# Patient Record
Sex: FEMALE | Race: WHITE | NOT HISPANIC OR LATINO
[De-identification: names, ages, dates, MRNs, and addresses within clinical notes are randomized per-mention and may not be internally consistent; named-entity substitution may affect disease eponyms.]

---

## 2018-02-05 ENCOUNTER — RESULT REVIEW (OUTPATIENT)
Age: 33
End: 2018-02-05

## 2021-11-30 ENCOUNTER — RESULT REVIEW (OUTPATIENT)
Age: 36
End: 2021-11-30

## 2021-12-30 ENCOUNTER — APPOINTMENT (OUTPATIENT)
Dept: VASCULAR SURGERY | Facility: CLINIC | Age: 36
End: 2021-12-30

## 2021-12-30 ENCOUNTER — APPOINTMENT (OUTPATIENT)
Dept: OBGYN | Facility: CLINIC | Age: 36
End: 2021-12-30
Payer: COMMERCIAL

## 2021-12-30 VITALS
DIASTOLIC BLOOD PRESSURE: 73 MMHG | WEIGHT: 116 LBS | SYSTOLIC BLOOD PRESSURE: 106 MMHG | HEIGHT: 62 IN | BODY MASS INDEX: 21.35 KG/M2

## 2021-12-30 DIAGNOSIS — O02.1 MISSED ABORTION: ICD-10-CM

## 2021-12-30 PROCEDURE — 76815 OB US LIMITED FETUS(S): CPT

## 2021-12-30 PROCEDURE — 99204 OFFICE O/P NEW MOD 45 MIN: CPT | Mod: 25

## 2021-12-30 NOTE — PROCEDURE
[Transabdominal OB Sonogram] : Transabdominal OB Sonogram [Intrauterine Pregnancy] : intrauterine pregnancy [Yolk Sac] : yolk sac present [CRL: ___ (mm)] : CRL - [unfilled]Umm [Current GA by Sonogram: ___ (wks)] : Current GA by Sonogram: [unfilled]Uwks [___ day(s)] : [unfilled] days [Fetal Heart] : no fetal heart

## 2021-12-31 LAB
BASOPHILS # BLD AUTO: 0.02 K/UL
BASOPHILS NFR BLD AUTO: 0.3 %
EOSINOPHIL # BLD AUTO: 0.1 K/UL
EOSINOPHIL NFR BLD AUTO: 1.5 %
HCT VFR BLD CALC: 37.9 %
HGB BLD-MCNC: 12.7 G/DL
IMM GRANULOCYTES NFR BLD AUTO: 0.1 %
LYMPHOCYTES # BLD AUTO: 2.6 K/UL
LYMPHOCYTES NFR BLD AUTO: 38.1 %
MAN DIFF?: NORMAL
MCHC RBC-ENTMCNC: 32.2 PG
MCHC RBC-ENTMCNC: 33.5 GM/DL
MCV RBC AUTO: 95.9 FL
MONOCYTES # BLD AUTO: 0.37 K/UL
MONOCYTES NFR BLD AUTO: 5.4 %
NEUTROPHILS # BLD AUTO: 3.72 K/UL
NEUTROPHILS NFR BLD AUTO: 54.6 %
PLATELET # BLD AUTO: 283 K/UL
RBC # BLD: 3.95 M/UL
RBC # FLD: 13.7 %
WBC # FLD AUTO: 6.82 K/UL

## 2022-01-02 ENCOUNTER — OUTPATIENT (OUTPATIENT)
Dept: OUTPATIENT SERVICES | Facility: HOSPITAL | Age: 37
LOS: 1 days | End: 2022-01-02
Payer: COMMERCIAL

## 2022-01-02 DIAGNOSIS — Z11.52 ENCOUNTER FOR SCREENING FOR COVID-19: ICD-10-CM

## 2022-01-02 LAB
ABO + RH PNL BLD: NORMAL
BLD GP AB SCN SERPL QL: NORMAL
C TRACH RRNA SPEC QL NAA+PROBE: NOT DETECTED
N GONORRHOEA RRNA SPEC QL NAA+PROBE: NOT DETECTED
SARS-COV-2 RNA SPEC QL NAA+PROBE: SIGNIFICANT CHANGE UP
SOURCE AMPLIFICATION: NORMAL

## 2022-01-02 PROCEDURE — U0005: CPT

## 2022-01-02 PROCEDURE — C9803: CPT

## 2022-01-02 PROCEDURE — U0003: CPT

## 2022-01-03 ENCOUNTER — TRANSCRIPTION ENCOUNTER (OUTPATIENT)
Age: 37
End: 2022-01-03

## 2022-01-03 RX ORDER — LIDOCAINE HCL 20 MG/ML
0.2 VIAL (ML) INJECTION ONCE
Refills: 0 | Status: DISCONTINUED | OUTPATIENT
Start: 2022-01-04 | End: 2022-01-18

## 2022-01-03 RX ORDER — SODIUM CHLORIDE 9 MG/ML
3 INJECTION INTRAMUSCULAR; INTRAVENOUS; SUBCUTANEOUS EVERY 8 HOURS
Refills: 0 | Status: DISCONTINUED | OUTPATIENT
Start: 2022-01-04 | End: 2022-01-18

## 2022-01-04 ENCOUNTER — OUTPATIENT (OUTPATIENT)
Dept: OUTPATIENT SERVICES | Facility: HOSPITAL | Age: 37
LOS: 1 days | End: 2022-01-04
Payer: COMMERCIAL

## 2022-01-04 ENCOUNTER — APPOINTMENT (OUTPATIENT)
Dept: OBGYN | Facility: CLINIC | Age: 37
End: 2022-01-04

## 2022-01-04 ENCOUNTER — RESULT REVIEW (OUTPATIENT)
Age: 37
End: 2022-01-04

## 2022-01-04 VITALS
SYSTOLIC BLOOD PRESSURE: 148 MMHG | WEIGHT: 115.08 LBS | DIASTOLIC BLOOD PRESSURE: 72 MMHG | HEART RATE: 128 BPM | RESPIRATION RATE: 16 BRPM | OXYGEN SATURATION: 100 % | TEMPERATURE: 98 F | HEIGHT: 62 IN

## 2022-01-04 VITALS
SYSTOLIC BLOOD PRESSURE: 116 MMHG | TEMPERATURE: 97 F | RESPIRATION RATE: 15 BRPM | HEART RATE: 96 BPM | OXYGEN SATURATION: 100 % | DIASTOLIC BLOOD PRESSURE: 56 MMHG

## 2022-01-04 DIAGNOSIS — Z90.49 ACQUIRED ABSENCE OF OTHER SPECIFIED PARTS OF DIGESTIVE TRACT: Chronic | ICD-10-CM

## 2022-01-04 DIAGNOSIS — O02.1 MISSED ABORTION: ICD-10-CM

## 2022-01-04 LAB
ANION GAP SERPL CALC-SCNC: 16 MMOL/L — SIGNIFICANT CHANGE UP (ref 5–17)
BLD GP AB SCN SERPL QL: NEGATIVE — SIGNIFICANT CHANGE UP
BUN SERPL-MCNC: 5 MG/DL — LOW (ref 7–23)
CALCIUM SERPL-MCNC: 9.4 MG/DL — SIGNIFICANT CHANGE UP (ref 8.4–10.5)
CHLORIDE SERPL-SCNC: 102 MMOL/L — SIGNIFICANT CHANGE UP (ref 96–108)
CO2 SERPL-SCNC: 22 MMOL/L — SIGNIFICANT CHANGE UP (ref 22–31)
CREAT SERPL-MCNC: 0.44 MG/DL — LOW (ref 0.5–1.3)
GLUCOSE SERPL-MCNC: 91 MG/DL — SIGNIFICANT CHANGE UP (ref 70–99)
HCT VFR BLD CALC: 38.2 % — SIGNIFICANT CHANGE UP (ref 34.5–45)
HGB BLD-MCNC: 13.6 G/DL — SIGNIFICANT CHANGE UP (ref 11.5–15.5)
MCHC RBC-ENTMCNC: 32.2 PG — SIGNIFICANT CHANGE UP (ref 27–34)
MCHC RBC-ENTMCNC: 35.6 GM/DL — SIGNIFICANT CHANGE UP (ref 32–36)
MCV RBC AUTO: 90.5 FL — SIGNIFICANT CHANGE UP (ref 80–100)
NRBC # BLD: 0 /100 WBCS — SIGNIFICANT CHANGE UP (ref 0–0)
PLATELET # BLD AUTO: 255 K/UL — SIGNIFICANT CHANGE UP (ref 150–400)
POTASSIUM SERPL-MCNC: 3.7 MMOL/L — SIGNIFICANT CHANGE UP (ref 3.5–5.3)
POTASSIUM SERPL-SCNC: 3.7 MMOL/L — SIGNIFICANT CHANGE UP (ref 3.5–5.3)
RBC # BLD: 4.22 M/UL — SIGNIFICANT CHANGE UP (ref 3.8–5.2)
RBC # FLD: 13.2 % — SIGNIFICANT CHANGE UP (ref 10.3–14.5)
RH IG SCN BLD-IMP: POSITIVE — SIGNIFICANT CHANGE UP
RH IG SCN BLD-IMP: POSITIVE — SIGNIFICANT CHANGE UP
SODIUM SERPL-SCNC: 140 MMOL/L — SIGNIFICANT CHANGE UP (ref 135–145)
WBC # BLD: 6.06 K/UL — SIGNIFICANT CHANGE UP (ref 3.8–10.5)
WBC # FLD AUTO: 6.06 K/UL — SIGNIFICANT CHANGE UP (ref 3.8–10.5)

## 2022-01-04 PROCEDURE — 88280 CHROMOSOME KARYOTYPE STUDY: CPT

## 2022-01-04 PROCEDURE — 88233 TISSUE CULTURE SKIN/BIOPSY: CPT

## 2022-01-04 PROCEDURE — 88264 CHROMOSOME ANALYSIS 20-25: CPT

## 2022-01-04 PROCEDURE — 88305 TISSUE EXAM BY PATHOLOGIST: CPT

## 2022-01-04 PROCEDURE — 85027 COMPLETE CBC AUTOMATED: CPT

## 2022-01-04 PROCEDURE — 88305 TISSUE EXAM BY PATHOLOGIST: CPT | Mod: 26

## 2022-01-04 PROCEDURE — 59820 CARE OF MISCARRIAGE: CPT

## 2022-01-04 PROCEDURE — 86850 RBC ANTIBODY SCREEN: CPT

## 2022-01-04 PROCEDURE — 76998 US GUIDE INTRAOP: CPT | Mod: 26

## 2022-01-04 PROCEDURE — 86900 BLOOD TYPING SEROLOGIC ABO: CPT

## 2022-01-04 PROCEDURE — 86901 BLOOD TYPING SEROLOGIC RH(D): CPT

## 2022-01-04 PROCEDURE — 80048 BASIC METABOLIC PNL TOTAL CA: CPT

## 2022-01-04 RX ORDER — FENTANYL CITRATE 50 UG/ML
25 INJECTION INTRAVENOUS
Refills: 0 | Status: DISCONTINUED | OUTPATIENT
Start: 2022-01-04 | End: 2022-01-04

## 2022-01-04 RX ORDER — ONDANSETRON 8 MG/1
4 TABLET, FILM COATED ORAL ONCE
Refills: 0 | Status: DISCONTINUED | OUTPATIENT
Start: 2022-01-04 | End: 2022-01-18

## 2022-01-04 RX ORDER — OXYCODONE HYDROCHLORIDE 5 MG/1
5 TABLET ORAL ONCE
Refills: 0 | Status: DISCONTINUED | OUTPATIENT
Start: 2022-01-04 | End: 2022-01-04

## 2022-01-04 NOTE — H&P ADULT - HISTORY OF PRESENT ILLNESS
35 y/o  @9w2d (LMP 10/26/21) presents for consultation for termination of pregnancy due to miscarriage.     Miscarriage was confirmed at Dr. Espinosa office. Amrita bleeding.     POB:  2012: LEATHA  2016: JANY ZHANG    PGYN: amrita    Works as a speech therapist   COVID vaccinated, diagnosed with COVID 3 weeks ago

## 2022-01-04 NOTE — H&P ADULT - ASSESSMENT
35 y/o  @9w2d (LMP 10/26/21) presents for consultation for termination of pregnancy due to miscarriage.     - Consents signed and in allscripts  - COVID neg   - T&S and CBC in system, f/u BMP

## 2022-01-04 NOTE — BRIEF OPERATIVE NOTE - NSICDXBRIEFPOSTOP_GEN_ALL_CORE_FT
POST-OP DIAGNOSIS:  Intrauterine fetal death before 20 weeks of gestation 04-Jan-2022 12:45:48  Archana Peacock   POST-OP DIAGNOSIS:  Missed  2022 09:48:02  Elma Holbrook

## 2022-01-04 NOTE — BRIEF OPERATIVE NOTE - NSICDXBRIEFPREOP_GEN_ALL_CORE_FT
PRE-OP DIAGNOSIS:  Intrauterine fetal death before 20 weeks of gestation 04-Jan-2022 12:45:37  Archana Peacock   PRE-OP DIAGNOSIS:  Missed  2022 09:47:42 1. IUP @ 10  mila  2. missed  Elma Holbrook

## 2022-01-04 NOTE — ASU PATIENT PROFILE, ADULT - FALL HARM RISK - UNIVERSAL INTERVENTIONS
Bed in lowest position, wheels locked, appropriate side rails in place/Call bell, personal items and telephone in reach/Instruct patient to call for assistance before getting out of bed or chair/Non-slip footwear when patient is out of bed/Busy to call system/Physically safe environment - no spills, clutter or unnecessary equipment/Purposeful Proactive Rounding/Room/bathroom lighting operational, light cord in reach

## 2022-01-04 NOTE — BRIEF OPERATIVE NOTE - NSICDXBRIEFPROCEDURE_GEN_ALL_CORE_FT
PROCEDURES:  Dilation and curettage, uterus 04-Jan-2022 12:45:24  Archana Peacock   PROCEDURES:  Surgical treatment, missed , first trimester 2022 09:47:19 1. EUA  2. dilation and curettage under ultrasound guidance Elma Holbrook

## 2022-01-04 NOTE — H&P ADULT - NSHPPHYSICALEXAM_GEN_ALL_CORE
Constitutional: appropriately responsive, alert and no acute distress.   Abdomen: soft, non-tender and non-distended.   Neurological: oriented x3.

## 2022-01-04 NOTE — BRIEF OPERATIVE NOTE - OPERATION/FINDINGS
Anteverted uterus, approx 8 weeks on size. Dilation and curettage done under ultrasound guidance. Thin EM stripe noted at end of case. Gestational sac examined and c/w ega Anteverted uterus, approx 8 weeks on size. Dilation and curettage done under ultrasound guidance. Thin EM stripe noted at end of case. Gestational sac and villi examined and c/w ega Anteverted uterus, approx 8 weeks on size. products of conception approximately 8 weeks size, gestational sac and villi noted. BT: A+

## 2022-01-04 NOTE — ASU DISCHARGE PLAN (ADULT/PEDIATRIC) - ASU DC SPECIAL INSTRUCTIONSFT
Postoperative Instructions      Pain control     For pain control, take the followin. Motrin 600mg four times a day, take with food  2. Add Tylenol 975 four times a day, alternated with motrin    Motrin and Tylenol can be obtained over the counter.         Postoperative restrictions   Do not drive or make important decisions for 24 hours after anesthesia. Nothing in the vagina (tampons, sexual intercourse), No tub baths, pools or hot tubs for 2 weeks (showers are ok!). No lifting anything heavier than 15 lbs, no strenuous exercise for 2 weeks after surgery.        Vaginal bleeding   Spotting and intermittent passage of blood clots per vagina is normal in first few weeks after surgery. If you are soaking 1 pad per hour, that is not normal and you should notify Dr. Holbrook's office and seek medical attention right away.      Signs of Infection  Call the office and/or come to the emergency room for any of the following: foul smelling vaginal discharge, fever over 100.4F, abdominal pain or cramping that does not get better with over the counter medications, nausea/vomiting (especially if you become unable to tolerate oral intake), inability to urinate. Any of these could be signs that you may be developing an infection requiring antibiotics.      Follow Up  Call Dr. Holbrook's office to schedule a postoperative appointment in 2 weeks.

## 2022-01-04 NOTE — ASU DISCHARGE PLAN (ADULT/PEDIATRIC) - NURSING INSTRUCTIONS
Please take Motrin and/or Tylenol as needed for pain.  NEXT DOSE of Motrin or Tylenol due at.......630pm.

## 2022-01-04 NOTE — ASU DISCHARGE PLAN (ADULT/PEDIATRIC) - NS MD DC FALL RISK RISK
For information on Fall & Injury Prevention, visit: [email or web address] OR  [email or web address] OR  https://www.cdc.gov/steadi/patient.html

## 2022-01-05 ENCOUNTER — NON-APPOINTMENT (OUTPATIENT)
Age: 37
End: 2022-01-05

## 2022-01-07 ENCOUNTER — APPOINTMENT (OUTPATIENT)
Dept: OBGYN | Facility: CLINIC | Age: 37
End: 2022-01-07
Payer: COMMERCIAL

## 2022-01-07 VITALS
WEIGHT: 117 LBS | HEIGHT: 62 IN | BODY MASS INDEX: 21.53 KG/M2 | SYSTOLIC BLOOD PRESSURE: 110 MMHG | DIASTOLIC BLOOD PRESSURE: 70 MMHG

## 2022-01-07 DIAGNOSIS — R10.2 PELVIC AND PERINEAL PAIN: ICD-10-CM

## 2022-01-07 LAB
BILIRUB UR QL STRIP: NORMAL
GLUCOSE UR-MCNC: NORMAL
HCG UR QL: 0.2 EU/DL
HGB UR QL STRIP.AUTO: NORMAL
KETONES UR-MCNC: NORMAL
LEUKOCYTE ESTERASE UR QL STRIP: NORMAL
NITRITE UR QL STRIP: NORMAL
PH UR STRIP: 6.5
PROT UR STRIP-MCNC: NORMAL
SP GR UR STRIP: 1

## 2022-01-07 PROCEDURE — 99212 OFFICE O/P EST SF 10 MIN: CPT | Mod: 24

## 2022-01-07 PROCEDURE — 81003 URINALYSIS AUTO W/O SCOPE: CPT | Mod: QW

## 2022-01-10 LAB
BACTERIA UR CULT: NORMAL
SURGICAL PATHOLOGY STUDY: SIGNIFICANT CHANGE UP

## 2022-01-11 PROBLEM — Z00.00 ENCOUNTER FOR PREVENTIVE HEALTH EXAMINATION: Noted: 2022-01-11

## 2022-01-18 ENCOUNTER — APPOINTMENT (OUTPATIENT)
Dept: OBGYN | Facility: CLINIC | Age: 37
End: 2022-01-18

## 2022-01-18 ENCOUNTER — NON-APPOINTMENT (OUTPATIENT)
Age: 37
End: 2022-01-18

## 2022-01-18 DIAGNOSIS — Z09 ENCOUNTER FOR FOLLOW-UP EXAMINATION AFTER COMPLETED TREATMENT FOR CONDITIONS OTHER THAN MALIGNANT NEOPLASM: ICD-10-CM

## 2022-01-18 NOTE — HISTORY OF PRESENT ILLNESS
[Home] : at home, [unfilled] , at the time of the visit. [Medical Office: ([facility])___] : at the medical office located in  [Verbal consent obtained from patient] : the patient, [unfilled] [FreeTextEntry1] : 35 yo  s/p D+C at 10 0/7 weeks for missed  on 22 presents for follow up.  Pt was seen for urgent visit on 22 for abdominal pain and inability to hold urine.  exam was benign and urine culture was negative. \par She reports feeling pressure in lower pelvis during urination.

## 2022-01-24 LAB — CHROM ANALY OVERALL INTERP SPEC-IMP: SIGNIFICANT CHANGE UP

## 2022-02-11 ENCOUNTER — NON-APPOINTMENT (OUTPATIENT)
Age: 37
End: 2022-02-11

## 2022-02-25 ENCOUNTER — APPOINTMENT (OUTPATIENT)
Dept: PEDIATRIC MEDICAL GENETICS | Facility: CLINIC | Age: 37
End: 2022-02-25
Payer: COMMERCIAL

## 2022-02-25 PROCEDURE — 96040: CPT | Mod: 95

## 2022-03-01 NOTE — HISTORY OF PRESENT ILLNESS
[Home] : at home, [unfilled] , at the time of the visit. [Medical Office: ([facility])___] : at the medical office located in  [Verbal consent obtained from patient] : the patient, [unfilled]

## 2022-04-28 ENCOUNTER — APPOINTMENT (OUTPATIENT)
Dept: HUMAN REPRODUCTION | Facility: CLINIC | Age: 37
End: 2022-04-28

## 2022-06-09 ENCOUNTER — APPOINTMENT (OUTPATIENT)
Dept: HUMAN REPRODUCTION | Facility: CLINIC | Age: 37
End: 2022-06-09
Payer: COMMERCIAL

## 2022-06-09 PROCEDURE — 36415 COLL VENOUS BLD VENIPUNCTURE: CPT

## 2022-06-09 PROCEDURE — 76830 TRANSVAGINAL US NON-OB: CPT

## 2022-06-09 PROCEDURE — 99205 OFFICE O/P NEW HI 60 MIN: CPT | Mod: 25

## 2022-06-23 ENCOUNTER — APPOINTMENT (OUTPATIENT)
Dept: HUMAN REPRODUCTION | Facility: CLINIC | Age: 37
End: 2022-06-23
Payer: COMMERCIAL

## 2022-06-23 PROCEDURE — 76831 ECHO EXAM UTERUS: CPT

## 2022-06-23 PROCEDURE — 58340 CATHETER FOR HYSTEROGRAPHY: CPT

## 2022-06-27 ENCOUNTER — APPOINTMENT (OUTPATIENT)
Dept: HUMAN REPRODUCTION | Facility: CLINIC | Age: 37
End: 2022-06-27

## 2022-06-27 PROCEDURE — 36415 COLL VENOUS BLD VENIPUNCTURE: CPT

## 2022-07-13 ENCOUNTER — APPOINTMENT (OUTPATIENT)
Dept: UROGYNECOLOGY | Facility: CLINIC | Age: 37
End: 2022-07-13

## 2022-08-05 ENCOUNTER — APPOINTMENT (OUTPATIENT)
Dept: HUMAN REPRODUCTION | Facility: CLINIC | Age: 37
End: 2022-08-05

## 2022-08-05 PROCEDURE — 99215 OFFICE O/P EST HI 40 MIN: CPT | Mod: 95

## 2022-10-24 ENCOUNTER — APPOINTMENT (OUTPATIENT)
Dept: VASCULAR SURGERY | Facility: CLINIC | Age: 37
End: 2022-10-24

## 2022-10-24 VITALS
WEIGHT: 125 LBS | HEIGHT: 62 IN | HEART RATE: 114 BPM | BODY MASS INDEX: 23 KG/M2 | DIASTOLIC BLOOD PRESSURE: 77 MMHG | SYSTOLIC BLOOD PRESSURE: 121 MMHG | TEMPERATURE: 98.1 F

## 2022-10-24 VITALS — SYSTOLIC BLOOD PRESSURE: 115 MMHG | HEART RATE: 105 BPM | DIASTOLIC BLOOD PRESSURE: 74 MMHG

## 2022-10-24 DIAGNOSIS — Z00.00 ENCOUNTER FOR GENERAL ADULT MEDICAL EXAMINATION W/OUT ABNORMAL FINDINGS: ICD-10-CM

## 2022-10-24 PROCEDURE — 99203 OFFICE O/P NEW LOW 30 MIN: CPT

## 2022-10-24 PROCEDURE — 93970 EXTREMITY STUDY: CPT

## 2023-04-17 ENCOUNTER — APPOINTMENT (OUTPATIENT)
Dept: HUMAN REPRODUCTION | Facility: CLINIC | Age: 38
End: 2023-04-17

## 2023-08-22 ENCOUNTER — APPOINTMENT (OUTPATIENT)
Dept: OBGYN | Facility: CLINIC | Age: 38
End: 2023-08-22
Payer: COMMERCIAL

## 2023-08-22 PROCEDURE — 36415 COLL VENOUS BLD VENIPUNCTURE: CPT

## 2023-08-22 PROCEDURE — 76830 TRANSVAGINAL US NON-OB: CPT

## 2023-08-22 PROCEDURE — 99214 OFFICE O/P EST MOD 30 MIN: CPT | Mod: 25

## 2023-09-15 ENCOUNTER — APPOINTMENT (OUTPATIENT)
Dept: OBGYN | Facility: CLINIC | Age: 38
End: 2023-09-15
Payer: COMMERCIAL

## 2023-09-15 PROCEDURE — 0502F SUBSEQUENT PRENATAL CARE: CPT

## 2023-09-15 PROCEDURE — 36415 COLL VENOUS BLD VENIPUNCTURE: CPT

## 2023-09-15 PROCEDURE — 76817 TRANSVAGINAL US OBSTETRIC: CPT

## 2023-09-21 ENCOUNTER — TRANSCRIPTION ENCOUNTER (OUTPATIENT)
Age: 38
End: 2023-09-21

## 2023-09-29 ENCOUNTER — APPOINTMENT (OUTPATIENT)
Dept: OBGYN | Facility: CLINIC | Age: 38
End: 2023-09-29
Payer: COMMERCIAL

## 2023-09-29 PROCEDURE — 0502F SUBSEQUENT PRENATAL CARE: CPT

## 2023-09-29 PROCEDURE — 76813 OB US NUCHAL MEAS 1 GEST: CPT

## 2023-09-29 PROCEDURE — 36415 COLL VENOUS BLD VENIPUNCTURE: CPT

## 2023-10-18 ENCOUNTER — APPOINTMENT (OUTPATIENT)
Dept: OBGYN | Facility: CLINIC | Age: 38
End: 2023-10-18
Payer: COMMERCIAL

## 2023-10-18 PROCEDURE — 76815 OB US LIMITED FETUS(S): CPT

## 2023-10-18 PROCEDURE — 99213 OFFICE O/P EST LOW 20 MIN: CPT | Mod: 25

## 2023-10-19 ENCOUNTER — APPOINTMENT (OUTPATIENT)
Dept: OBGYN | Facility: CLINIC | Age: 38
End: 2023-10-19

## 2023-10-27 ENCOUNTER — APPOINTMENT (OUTPATIENT)
Dept: OBGYN | Facility: CLINIC | Age: 38
End: 2023-10-27
Payer: COMMERCIAL

## 2023-10-27 PROCEDURE — 90471 IMMUNIZATION ADMIN: CPT

## 2023-10-27 PROCEDURE — 90686 IIV4 VACC NO PRSV 0.5 ML IM: CPT

## 2023-10-27 PROCEDURE — 0502F SUBSEQUENT PRENATAL CARE: CPT

## 2023-10-27 PROCEDURE — 36415 COLL VENOUS BLD VENIPUNCTURE: CPT

## 2023-11-27 ENCOUNTER — APPOINTMENT (OUTPATIENT)
Dept: OBGYN | Facility: CLINIC | Age: 38
End: 2023-11-27
Payer: COMMERCIAL

## 2023-11-27 ENCOUNTER — APPOINTMENT (OUTPATIENT)
Dept: ANTEPARTUM | Facility: CLINIC | Age: 38
End: 2023-11-27
Payer: COMMERCIAL

## 2023-11-27 ENCOUNTER — ASOB RESULT (OUTPATIENT)
Age: 38
End: 2023-11-27

## 2023-11-27 PROCEDURE — 76811 OB US DETAILED SNGL FETUS: CPT

## 2023-11-27 PROCEDURE — 0502F SUBSEQUENT PRENATAL CARE: CPT

## 2023-12-08 NOTE — HISTORY OF PRESENT ILLNESS
[FreeTextEntry1] : 35 y/o  @9w2d (LMP 10/26/21) presents for consultation for termination of pregnancy due to miscarriage. \par \par Miscarriage was confirmed at Dr. Espinosa office. Denies bleeding. \par \par POB:\par 2012: \par 2016: TIUP \par \par PGYN: denies\par \par Works as a speech therapist \par COVID vaccinated, diagnosed with COVID 3 weeks ago True and Accepted Max Gage MD

## 2023-12-19 ENCOUNTER — ASOB RESULT (OUTPATIENT)
Age: 38
End: 2023-12-19

## 2023-12-19 ENCOUNTER — APPOINTMENT (OUTPATIENT)
Dept: ANTEPARTUM | Facility: CLINIC | Age: 38
End: 2023-12-19
Payer: COMMERCIAL

## 2023-12-19 PROCEDURE — 76816 OB US FOLLOW-UP PER FETUS: CPT

## 2024-01-02 ENCOUNTER — APPOINTMENT (OUTPATIENT)
Dept: OBGYN | Facility: CLINIC | Age: 39
End: 2024-01-02
Payer: COMMERCIAL

## 2024-01-02 PROCEDURE — 0502F SUBSEQUENT PRENATAL CARE: CPT

## 2024-01-18 ENCOUNTER — APPOINTMENT (OUTPATIENT)
Dept: OBGYN | Facility: CLINIC | Age: 39
End: 2024-01-18
Payer: COMMERCIAL

## 2024-01-18 PROCEDURE — 0502F SUBSEQUENT PRENATAL CARE: CPT

## 2024-01-18 PROCEDURE — 36415 COLL VENOUS BLD VENIPUNCTURE: CPT

## 2024-01-23 ENCOUNTER — APPOINTMENT (OUTPATIENT)
Dept: OBGYN | Facility: CLINIC | Age: 39
End: 2024-01-23
Payer: COMMERCIAL

## 2024-01-23 PROCEDURE — 99212 OFFICE O/P EST SF 10 MIN: CPT | Mod: 25

## 2024-01-23 PROCEDURE — 59025 FETAL NON-STRESS TEST: CPT

## 2024-02-05 ENCOUNTER — APPOINTMENT (OUTPATIENT)
Dept: OBGYN | Facility: CLINIC | Age: 39
End: 2024-02-05
Payer: COMMERCIAL

## 2024-02-05 PROCEDURE — 99213 OFFICE O/P EST LOW 20 MIN: CPT

## 2024-02-08 ENCOUNTER — APPOINTMENT (OUTPATIENT)
Dept: ANTEPARTUM | Facility: CLINIC | Age: 39
End: 2024-02-08
Payer: COMMERCIAL

## 2024-02-08 ENCOUNTER — ASOB RESULT (OUTPATIENT)
Age: 39
End: 2024-02-08

## 2024-02-08 PROCEDURE — 76819 FETAL BIOPHYS PROFIL W/O NST: CPT

## 2024-02-08 PROCEDURE — 76816 OB US FOLLOW-UP PER FETUS: CPT

## 2024-02-15 ENCOUNTER — APPOINTMENT (OUTPATIENT)
Dept: OBGYN | Facility: CLINIC | Age: 39
End: 2024-02-15
Payer: COMMERCIAL

## 2024-02-15 PROCEDURE — 0502F SUBSEQUENT PRENATAL CARE: CPT

## 2024-02-15 PROCEDURE — 76819 FETAL BIOPHYS PROFIL W/O NST: CPT | Mod: 59

## 2024-02-15 PROCEDURE — 76816 OB US FOLLOW-UP PER FETUS: CPT

## 2024-02-29 ENCOUNTER — APPOINTMENT (OUTPATIENT)
Dept: OBGYN | Facility: CLINIC | Age: 39
End: 2024-02-29
Payer: COMMERCIAL

## 2024-02-29 PROCEDURE — 0502F SUBSEQUENT PRENATAL CARE: CPT

## 2024-03-14 ENCOUNTER — APPOINTMENT (OUTPATIENT)
Dept: OBGYN | Facility: CLINIC | Age: 39
End: 2024-03-14
Payer: COMMERCIAL

## 2024-03-14 PROCEDURE — 90471 IMMUNIZATION ADMIN: CPT

## 2024-03-14 PROCEDURE — 90715 TDAP VACCINE 7 YRS/> IM: CPT

## 2024-03-14 PROCEDURE — 0502F SUBSEQUENT PRENATAL CARE: CPT

## 2024-03-14 PROCEDURE — 76818 FETAL BIOPHYS PROFILE W/NST: CPT | Mod: 59

## 2024-03-14 PROCEDURE — 76816 OB US FOLLOW-UP PER FETUS: CPT

## 2024-03-22 ENCOUNTER — APPOINTMENT (OUTPATIENT)
Dept: OBGYN | Facility: CLINIC | Age: 39
End: 2024-03-22
Payer: COMMERCIAL

## 2024-03-22 PROCEDURE — 76818 FETAL BIOPHYS PROFILE W/NST: CPT

## 2024-03-22 PROCEDURE — 0502F SUBSEQUENT PRENATAL CARE: CPT

## 2024-03-29 ENCOUNTER — APPOINTMENT (OUTPATIENT)
Dept: OBGYN | Facility: CLINIC | Age: 39
End: 2024-03-29
Payer: COMMERCIAL

## 2024-03-29 PROCEDURE — 76816 OB US FOLLOW-UP PER FETUS: CPT

## 2024-03-29 PROCEDURE — 0502F SUBSEQUENT PRENATAL CARE: CPT

## 2024-03-29 PROCEDURE — 76818 FETAL BIOPHYS PROFILE W/NST: CPT | Mod: 59

## 2024-04-01 ENCOUNTER — INPATIENT (INPATIENT)
Facility: HOSPITAL | Age: 39
LOS: 1 days | Discharge: HOME | End: 2024-04-03
Attending: OBSTETRICS & GYNECOLOGY | Admitting: OBSTETRICS & GYNECOLOGY
Payer: COMMERCIAL

## 2024-04-01 VITALS — HEART RATE: 121 BPM | OXYGEN SATURATION: 100 %

## 2024-04-01 DIAGNOSIS — O26.899 OTHER SPECIFIED PREGNANCY RELATED CONDITIONS, UNSPECIFIED TRIMESTER: ICD-10-CM

## 2024-04-01 DIAGNOSIS — Z90.49 ACQUIRED ABSENCE OF OTHER SPECIFIED PARTS OF DIGESTIVE TRACT: Chronic | ICD-10-CM

## 2024-04-02 DIAGNOSIS — Z34.80 ENCOUNTER FOR SUPERVISION OF OTHER NORMAL PREGNANCY, UNSPECIFIED TRIMESTER: ICD-10-CM

## 2024-04-02 LAB
BASOPHILS # BLD AUTO: 0.02 K/UL — SIGNIFICANT CHANGE UP (ref 0–0.2)
BASOPHILS NFR BLD AUTO: 0.2 % — SIGNIFICANT CHANGE UP (ref 0–2)
BLD GP AB SCN SERPL QL: NEGATIVE — SIGNIFICANT CHANGE UP
EOSINOPHIL # BLD AUTO: 0.09 K/UL — SIGNIFICANT CHANGE UP (ref 0–0.5)
EOSINOPHIL NFR BLD AUTO: 0.9 % — SIGNIFICANT CHANGE UP (ref 0–6)
HCT VFR BLD CALC: 35.6 % — SIGNIFICANT CHANGE UP (ref 34.5–45)
HGB BLD-MCNC: 12.3 G/DL — SIGNIFICANT CHANGE UP (ref 11.5–15.5)
IMM GRANULOCYTES NFR BLD AUTO: 0.9 % — SIGNIFICANT CHANGE UP (ref 0–0.9)
LYMPHOCYTES # BLD AUTO: 2.37 K/UL — SIGNIFICANT CHANGE UP (ref 1–3.3)
LYMPHOCYTES # BLD AUTO: 24.1 % — SIGNIFICANT CHANGE UP (ref 13–44)
MCHC RBC-ENTMCNC: 32.1 PG — SIGNIFICANT CHANGE UP (ref 27–34)
MCHC RBC-ENTMCNC: 34.6 GM/DL — SIGNIFICANT CHANGE UP (ref 32–36)
MCV RBC AUTO: 93 FL — SIGNIFICANT CHANGE UP (ref 80–100)
MONOCYTES # BLD AUTO: 0.71 K/UL — SIGNIFICANT CHANGE UP (ref 0–0.9)
MONOCYTES NFR BLD AUTO: 7.2 % — SIGNIFICANT CHANGE UP (ref 2–14)
NEUTROPHILS # BLD AUTO: 6.54 K/UL — SIGNIFICANT CHANGE UP (ref 1.8–7.4)
NEUTROPHILS NFR BLD AUTO: 66.7 % — SIGNIFICANT CHANGE UP (ref 43–77)
NRBC # BLD: 0 /100 WBCS — SIGNIFICANT CHANGE UP (ref 0–0)
PLATELET # BLD AUTO: 186 K/UL — SIGNIFICANT CHANGE UP (ref 150–400)
RBC # BLD: 3.83 M/UL — SIGNIFICANT CHANGE UP (ref 3.8–5.2)
RBC # FLD: 14.2 % — SIGNIFICANT CHANGE UP (ref 10.3–14.5)
RH IG SCN BLD-IMP: POSITIVE — SIGNIFICANT CHANGE UP
T PALLIDUM AB TITR SER: NEGATIVE — SIGNIFICANT CHANGE UP
WBC # BLD: 9.82 K/UL — SIGNIFICANT CHANGE UP (ref 3.8–10.5)
WBC # FLD AUTO: 9.82 K/UL — SIGNIFICANT CHANGE UP (ref 3.8–10.5)

## 2024-04-02 PROCEDURE — 59400 OBSTETRICAL CARE: CPT

## 2024-04-02 RX ORDER — AMPICILLIN TRIHYDRATE 250 MG
2 CAPSULE ORAL ONCE
Refills: 0 | Status: COMPLETED | OUTPATIENT
Start: 2024-04-02 | End: 2024-04-02

## 2024-04-02 RX ORDER — OXYTOCIN 10 UNIT/ML
4 VIAL (ML) INJECTION
Qty: 30 | Refills: 0 | Status: DISCONTINUED | OUTPATIENT
Start: 2024-04-02 | End: 2024-04-03

## 2024-04-02 RX ORDER — SODIUM CHLORIDE 9 MG/ML
500 INJECTION, SOLUTION INTRAVENOUS ONCE
Refills: 0 | Status: DISCONTINUED | OUTPATIENT
Start: 2024-04-02 | End: 2024-04-03

## 2024-04-02 RX ORDER — SODIUM CHLORIDE 9 MG/ML
3 INJECTION INTRAMUSCULAR; INTRAVENOUS; SUBCUTANEOUS EVERY 8 HOURS
Refills: 0 | Status: DISCONTINUED | OUTPATIENT
Start: 2024-04-02 | End: 2024-04-03

## 2024-04-02 RX ORDER — ALBUTEROL 90 UG/1
2 AEROSOL, METERED ORAL EVERY 6 HOURS
Refills: 0 | Status: DISCONTINUED | OUTPATIENT
Start: 2024-04-02 | End: 2024-04-03

## 2024-04-02 RX ORDER — AMPICILLIN TRIHYDRATE 250 MG
1 CAPSULE ORAL EVERY 4 HOURS
Refills: 0 | Status: DISCONTINUED | OUTPATIENT
Start: 2024-04-02 | End: 2024-04-02

## 2024-04-02 RX ORDER — BENZOCAINE 10 %
1 GEL (GRAM) MUCOUS MEMBRANE EVERY 6 HOURS
Refills: 0 | Status: DISCONTINUED | OUTPATIENT
Start: 2024-04-02 | End: 2024-04-03

## 2024-04-02 RX ORDER — OXYTOCIN 10 UNIT/ML
41.67 VIAL (ML) INJECTION
Qty: 20 | Refills: 0 | Status: DISCONTINUED | OUTPATIENT
Start: 2024-04-02 | End: 2024-04-03

## 2024-04-02 RX ORDER — OXYCODONE HYDROCHLORIDE 5 MG/1
5 TABLET ORAL ONCE
Refills: 0 | Status: DISCONTINUED | OUTPATIENT
Start: 2024-04-02 | End: 2024-04-03

## 2024-04-02 RX ORDER — IBUPROFEN 200 MG
600 TABLET ORAL EVERY 6 HOURS
Refills: 0 | Status: COMPLETED | OUTPATIENT
Start: 2024-04-02 | End: 2025-03-01

## 2024-04-02 RX ORDER — DIBUCAINE 1 %
1 OINTMENT (GRAM) RECTAL EVERY 6 HOURS
Refills: 0 | Status: DISCONTINUED | OUTPATIENT
Start: 2024-04-02 | End: 2024-04-03

## 2024-04-02 RX ORDER — LANOLIN
1 OINTMENT (GRAM) TOPICAL EVERY 6 HOURS
Refills: 0 | Status: DISCONTINUED | OUTPATIENT
Start: 2024-04-02 | End: 2024-04-03

## 2024-04-02 RX ORDER — SODIUM CHLORIDE 9 MG/ML
1000 INJECTION, SOLUTION INTRAVENOUS
Refills: 0 | Status: DISCONTINUED | OUTPATIENT
Start: 2024-04-02 | End: 2024-04-02

## 2024-04-02 RX ORDER — HYDROCORTISONE 1 %
1 OINTMENT (GRAM) TOPICAL EVERY 6 HOURS
Refills: 0 | Status: DISCONTINUED | OUTPATIENT
Start: 2024-04-02 | End: 2024-04-03

## 2024-04-02 RX ORDER — OXYCODONE HYDROCHLORIDE 5 MG/1
5 TABLET ORAL
Refills: 0 | Status: DISCONTINUED | OUTPATIENT
Start: 2024-04-02 | End: 2024-04-03

## 2024-04-02 RX ORDER — TETANUS TOXOID, REDUCED DIPHTHERIA TOXOID AND ACELLULAR PERTUSSIS VACCINE, ADSORBED 5; 2.5; 8; 8; 2.5 [IU]/.5ML; [IU]/.5ML; UG/.5ML; UG/.5ML; UG/.5ML
0.5 SUSPENSION INTRAMUSCULAR ONCE
Refills: 0 | Status: DISCONTINUED | OUTPATIENT
Start: 2024-04-02 | End: 2024-04-03

## 2024-04-02 RX ORDER — SIMETHICONE 80 MG/1
80 TABLET, CHEWABLE ORAL EVERY 4 HOURS
Refills: 0 | Status: DISCONTINUED | OUTPATIENT
Start: 2024-04-02 | End: 2024-04-03

## 2024-04-02 RX ORDER — ACETAMINOPHEN 500 MG
975 TABLET ORAL
Refills: 0 | Status: DISCONTINUED | OUTPATIENT
Start: 2024-04-02 | End: 2024-04-03

## 2024-04-02 RX ORDER — OXYTOCIN 10 UNIT/ML
333.33 VIAL (ML) INJECTION
Qty: 20 | Refills: 0 | Status: COMPLETED | OUTPATIENT
Start: 2024-04-02 | End: 2024-04-02

## 2024-04-02 RX ORDER — MAGNESIUM HYDROXIDE 400 MG/1
30 TABLET, CHEWABLE ORAL
Refills: 0 | Status: DISCONTINUED | OUTPATIENT
Start: 2024-04-02 | End: 2024-04-03

## 2024-04-02 RX ORDER — CITRIC ACID/SODIUM CITRATE 300-500 MG
15 SOLUTION, ORAL ORAL EVERY 6 HOURS
Refills: 0 | Status: DISCONTINUED | OUTPATIENT
Start: 2024-04-02 | End: 2024-04-02

## 2024-04-02 RX ORDER — DIPHENHYDRAMINE HCL 50 MG
25 CAPSULE ORAL EVERY 6 HOURS
Refills: 0 | Status: DISCONTINUED | OUTPATIENT
Start: 2024-04-02 | End: 2024-04-03

## 2024-04-02 RX ORDER — PRAMOXINE HYDROCHLORIDE 150 MG/15G
1 AEROSOL, FOAM RECTAL EVERY 4 HOURS
Refills: 0 | Status: DISCONTINUED | OUTPATIENT
Start: 2024-04-02 | End: 2024-04-03

## 2024-04-02 RX ORDER — CHLORHEXIDINE GLUCONATE 213 G/1000ML
1 SOLUTION TOPICAL DAILY
Refills: 0 | Status: DISCONTINUED | OUTPATIENT
Start: 2024-04-02 | End: 2024-04-02

## 2024-04-02 RX ORDER — OXYTOCIN 10 UNIT/ML
4 VIAL (ML) INJECTION
Qty: 30 | Refills: 0 | Status: DISCONTINUED | OUTPATIENT
Start: 2024-04-02 | End: 2024-04-02

## 2024-04-02 RX ORDER — KETOROLAC TROMETHAMINE 30 MG/ML
30 SYRINGE (ML) INJECTION ONCE
Refills: 0 | Status: DISCONTINUED | OUTPATIENT
Start: 2024-04-02 | End: 2024-04-02

## 2024-04-02 RX ORDER — IBUPROFEN 200 MG
600 TABLET ORAL EVERY 6 HOURS
Refills: 0 | Status: DISCONTINUED | OUTPATIENT
Start: 2024-04-02 | End: 2024-04-03

## 2024-04-02 RX ORDER — AER TRAVELER 0.5 G/1
1 SOLUTION RECTAL; TOPICAL EVERY 4 HOURS
Refills: 0 | Status: DISCONTINUED | OUTPATIENT
Start: 2024-04-02 | End: 2024-04-03

## 2024-04-02 RX ADMIN — Medication 975 MILLIGRAM(S): at 21:30

## 2024-04-02 RX ADMIN — Medication 975 MILLIGRAM(S): at 15:32

## 2024-04-02 RX ADMIN — Medication 4 MILLIUNIT(S)/MIN: at 06:04

## 2024-04-02 RX ADMIN — Medication 975 MILLIGRAM(S): at 15:02

## 2024-04-02 RX ADMIN — Medication 200 GRAM(S): at 00:53

## 2024-04-02 RX ADMIN — Medication 30 MILLIGRAM(S): at 10:14

## 2024-04-02 RX ADMIN — Medication 600 MILLIGRAM(S): at 18:22

## 2024-04-02 RX ADMIN — SODIUM CHLORIDE 3 MILLILITER(S): 9 INJECTION INTRAMUSCULAR; INTRAVENOUS; SUBCUTANEOUS at 22:00

## 2024-04-02 RX ADMIN — SODIUM CHLORIDE 3 MILLILITER(S): 9 INJECTION INTRAMUSCULAR; INTRAVENOUS; SUBCUTANEOUS at 15:39

## 2024-04-02 RX ADMIN — Medication 600 MILLIGRAM(S): at 17:52

## 2024-04-02 RX ADMIN — Medication 125 MILLIUNIT(S)/MIN: at 10:15

## 2024-04-02 RX ADMIN — Medication 108 GRAM(S): at 08:45

## 2024-04-02 RX ADMIN — Medication 975 MILLIGRAM(S): at 20:43

## 2024-04-02 RX ADMIN — Medication 1000 MILLIUNIT(S)/MIN: at 09:03

## 2024-04-02 RX ADMIN — Medication 4 MILLIUNIT(S)/MIN: at 08:46

## 2024-04-02 NOTE — OB PROVIDER H&P - NSHPPHYSICALEXAM_GEN_ALL_CORE
Vital Signs Last 24 Hrs  T(C): --  T(F): --  HR: 96 (02 Apr 2024 00:56) (92 - 127)  BP: 109/65 (01 Apr 2024 23:57) (109/65 - 109/65)  BP(mean): --  RR: --  SpO2: 100% (02 Apr 2024 00:56) (96% - 100%)        SVE: 1/0/-3  FHT: Baseline: 140 , moderate variability, + accels, - decels  [place]: q irr ctx  Sono: Vertex

## 2024-04-02 NOTE — OB RN PATIENT PROFILE - FALL HARM RISK - UNIVERSAL INTERVENTIONS
Bed in lowest position, wheels locked, appropriate side rails in place/Call bell, personal items and telephone in reach/Instruct patient to call for assistance before getting out of bed or chair/Non-slip footwear when patient is out of bed/[place] to call system/Physically safe environment - no spills, clutter or unnecessary equipment/Purposeful Proactive Rounding/Room/bathroom lighting operational, light cord in reach

## 2024-04-02 NOTE — OB PROVIDER LABOR PROGRESS NOTE - NS_OBIHIFHRDETAILS_OBGYN_ALL_OB_FT
cat 2
Baseline: 140 bpm, moderate variability,  + accels, +2 min decel
baseline 150  moderate variability  -accels  +intermittent late decels
cat 1
Baseline: 140 bpm, moderate variability,  + accels, - decels

## 2024-04-02 NOTE — OB RN DELIVERY SUMMARY - NS_SEPSISRSKCALC_OBGYN_ALL_OB_FT
EOS calculated successfully. EOS Risk Factor: 0.5/1000 live births ([facility] national incidence); GA=39w;Temp=98.8; ROM=10.5; GBS='Positive'; Antibiotics='Broad spectrum antibiotics > 4 hrs prior to birth'

## 2024-04-02 NOTE — OB PROVIDER H&P - HISTORY OF PRESENT ILLNESS
HPI: Pt is a 38y   @39w presenting for rupture of membranes. She reports leakage of clear fluid at 1030p with leakage of fluid since then. She reports feeling fetal movement with no contractions or vaginal bleeding. PNC otherwise uncomplicated.  FM (+)  LOF (-)  CTX (-)  VB (-)  GBS pos  EFW: 3400g    OBHx:   7#3, 2016  of TIUP 6#6 and 5#8, SAB sp D&C   GynHx: Denies uterine polyps, fibroids, ovarian cysts, no abnml paps or STI/STDs  PMHx: Denies  PSHx: LSC tsering  Med: PNV  All: NKDA  Psych: Denies hx of mental health issues  SH: Denies hx of smoking, drinking, or drug usage during the pregnancy

## 2024-04-02 NOTE — OB RN PATIENT PROFILE - EDUCATION PROVIDED ON ASSESSMENT OF INFANT "FEEDING CUES" AND THE IMPORTANCE OF FEEDING "ON CUE" / "BABY-LED" FEEDINGS
Chief Complaint   Patient presents with   • Anxiety       SUBJECTIVE:   Darcy Howell is a 49 year old female, who is here today to discuss anxiety.    She is here today to discuss anxiety. She was previously seen by Mery Velasquez NP at [facility]. Mery Velasquez recently retired, and Darcy's  changed jobs, so she needed to switch to [place].     She states her anxiety started in April when her 18-year-old autistic son was arrested. She states he has legal issues, doesn't understand the ramifications of his actions. She finds this incredibly overwhelming, she is feeling panicky, having difficulty with sleeping. Difficulty with eating. She has no history of depression or anxiety. States she doesn't really feel depressed, but is just very on edge and anxious. She has been taking an over-the-counter supplement that contains a lot of magnesium. Takes this a couple times per day, and finds good relief in her anxiety. He also feels like she is able to focus better. States she is typically a good sleeper, but has had a lot of issues with sleep related to her anxiety. Her supplementdoes not seem to be helping her sleep well. She has tried melatonin up to 9 mg nightly without improvement in sleep. Last night, she has tried Advil PM and slept well.    REVIEW OF SYSTEMS:    Constitutional: Denies recent illness, generalized fatigue, fevers  Respiratory: Denies difficulty breathing, shortness of breath, hemoptysis, wheezing, cough, orthopnea  Cardiovascular:  Denies chest pain, pressure or tightness, palpitations, lower extremity edema, claudication  Gastrointestinal:  Denies abdominal pain, nausea, vomiting, diarrhea, constipation, bloody stools  Genitourinary:  Denies urinary pain, urgency, frequency, hematuria, flank pain.  Musculoskeletal:  Denies any joint or muscle pain. No unusual swelling or weakness.  Integument:  Denies rash  Neurologic:  Denies headache, dizziness, syncopal episodes  Over the last 2 weeks, how  often have you been bothered by the following problems?          PHQ2 Score:  6  1. Little interest or pleasure in doing things?:  3  2. Feeling down, depressed, or hopeless?:  3     PHQ9 Score:  18  3. Trouble falling, staying asleep or sleeping too much?:  3  4. Feeling tired or having little energy?:  3  5. Poor appetite or overeating?:  3  6. Feeling bad about yourself - or that you are a failure or that you have let yourself or your family down?:  0  7. Trouble concentrating on things such as reading a newspaper or watching television?:  3  8. Moving or speaking so slowly that other people could have noticed? Or the opposite - being so fidgety or restless that you were moving around a lot more than usual?:  0  9. Thoughts that you would be better off dead, or of hurting yourself in some way?:  0       Generalized Anxiety Disorder (GAD7)    Over the last 2 weeks, how often have you been bothered by the following problems?   Score:  15    Score:   0-4 minimal symptoms 10-14 moderate symptoms   5-9 mild symptoms 15-21 severe symptoms      1.  Feeling nervous, anxious or on edge Nearly every day   2.  Not being able to stop or control worrying Nearly every day   3.  Worrying too much about different things Nearly every day   4.  Trouble relaxing Nearly every day   5.  Being so restless that it's hard to sit still Not at all   6.  Becoming easily annoyed or irritable Not at all   7.  Feeling afraid as if something awful might happen Nearly every day            If you checked off any problems, how difficult have these made it for you to do your work, take care of things at home, or get along with other people?             ALLERGIES:  ALLERGIES:   Allergen Reactions   • Monistat SWELLING     Swelling and itching         MEDICATIONS:   Outpatient Medications Marked as Taking for the 7/8/19 encounter (Office Visit) with ESTEFANI Cardenas   Medication Sig Dispense Refill   • Multiple Vitamin (MULTIVITAMIN) capsule  Take 1 capsule by mouth daily.     • TAURINE PO      • Gamma-Aminobutyric Acid (NICOLE) 1000 MG/10ML Solution      • L-THEANINE PO      • MAGNESIUM OXIDE PO            HISTORIES:  Past medical, surgical, and family histories were reviewed (and updated as needed) at today's visit, 7/8/19. They can be viewed in the electronic medical record.     Social History     Occupational History   • Occupation:    • Occupation: Part - time at [employer]   Tobacco Use   • Smoking status: Never Smoker   • Smokeless tobacco: Never Used   Substance and Sexual Activity   • Alcohol use: Yes     Comment: occasional   • Drug use: No     Social History     Social History Narrative    She is . 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    She has 2 sons, age 18 and 15.    Works as a  (Off for summer)    She also has a part-time job working at [employer]         OBJECTIVE:    Constitutional:  Well developed, well nourished in no apparent distress, nontoxic appearance   Vitals:  Blood pressure 124/88, pulse 60, resp. rate 16, height 5' 5.5\" (1.664 m), weight 65 kg, last menstrual period 02/04/2016. Body mass index is 23.48 kg/m².  Neck: Supple. No masses or thyromegaly.  Respiratory:  Lungs clear to auscultation. Normal aeration. No adventitious sounds. Normal respiratory effort.    Cardiovascular:  Regular rate and rhythm.  Normal S1 and S2 without murmurs, clicks, gallops.  No lower extremity edema.  2+ pedal pulses.  No carotid bruits.  Psychiatric:  She appears very anxious. Alert and oriented x3, good eye contact, connected thoughts. Dress, appearance, speech, and behavior are appropriate. Cooperative. Recent and remote memory intact. Appears to have adequate judgment and insight.       ASSESSMENT AND PLAN:    1. Anxiety as acute reaction to exceptional stress      · Prescribed escitalopram. Discussed medication dosage, usage, goals of therapy, and side effects. She is  aware that this can take 4-6 weeks for optimal effectiveness.   · We discussed multiple options to help with anxiety while this medication is kicking in. She prefers to continue with her magnesium supplement (which has other added components). Discussed that some supplements do interact with medications resulting in adverse effects. We did add the active ingredients to her medication list, and there appear to be no significant interactions.  · She was advised to consider counseling for herself and her family. Recommend that she contact [place]'s employee assistance program regarding counseling and legal assistance. (May also consider the one through her school).   · Darcy was instructed to follow up in 6 weeks for medication management, sooner with issues.  · She left the office in no acute distress and understanding her plan of care. All of her questions were answered to her satisfaction. She was encouraged to call with any questions or concerns, and return to the clinic as needed.  · She verbalized understanding and is in agreement with the above plan.     Orders Placed This Encounter   • escitalopram (LEXAPRO) 10 MG tablet       Note: This document was dictated and transcribed using Dragon voice-activated software. This provider has made good ania attempts to make the appropriate corrections to the documentation; however, areas of commission, omission, and mistakes in wording may still exist.      · Greater than 50% of today's visit was spent with counseling and coordination of care; total time spent 40 minutes.       Statement Selected

## 2024-04-02 NOTE — OB PROVIDER H&P - NSPREVIOUSTERM_OBGYN_ALL_OB
[FreeTextEntry1] : CT C/A/P  (06/23/2022)\par FINDINGS:\par CHEST:\par LUNGS AND LARGE AIRWAYS: Patent central airways. There is a 7 mm left lower lobe nodule which is unchanged and a new 9 mm nodule in the lingula. Calcified granuloma in the left upper lobe. Tree-in-bud opacities in the left lower lobe and lingula.\par PLEURA: No pleural effusion.\par VESSELS: Small pulmonary emboli in the right lower lobe segmental artery (304:56)\par HEART: Heart size is normal. No pericardial effusion.\par MEDIASTINUM AND IVETTE: No lymphadenopathy.\par CHEST WALL AND LOWER NECK: Right-sided Mediport with the tip in the SVC. Left thyroid gland is atrophic or surgically absent.\par \par ABDOMEN AND PELVIS:\par LIVER: Within normal limits.\par BILE DUCTS: Pneumobilia and mild biliary ductal dilatation, similar in appearance to prior.\par GALLBLADDER: Cholecystectomy.\par SPLEEN: Within normal limits.\par PANCREAS: Status post pylorus sparing Whipple. Pancreaticojejunostomy and hepaticojejunostomy are within normal limits. Distal pancreatic atrophy with 1.5 cm cystic lesion in the pancreatic tail.\par ADRENALS: Question mild thickening of the right adrenal gland.\par KIDNEYS/URETERS: Bilateral renal cysts. No hydronephrosis. Cortical scarring in the interpolar region of the left kidney.\par \par BLADDER: Minimally distended with diffuse wall thickening.\par REPRODUCTIVE ORGANS: Prostate within normal limits.\par \par BOWEL: Status post Whipple procedure. No small bowel anastomoses in the right hemiabdomen. No bowel obstruction. Appendix is not visualized.\par PERITONEUM/MESENTERY: Moderate abdominal and pelvic ascites with peritoneal nodularity compatible carcinomatosis. Hazy soft tissue surrounds the gastric antrum in the right upper quadrant.\par VESSELS: Atherosclerotic changes.\par RETROPERITONEUM/LYMPH NODES: Mesenteric adenopathy, similar in appearance to prior.\par ABDOMINAL WALL: Postsurgical changes.\par BONES: Degenerative changes.\par \par IMPRESSION:\par Moderate pneumoperitoneum concerning for bowel perforation.\par Small right lower lobe segmental pulmonary embolism.\par Critical findings were discussed with Dr. Cary on 6/24/2022 3:10 PM.\par \par New tree-in-bud opacities in the left lower lobe and lingula which may be infectious in etiology. New 9 mm nodule in the lingula concerning for metastatic disease. Short-term follow-up chest CT is recommended.\par \par Diffuse bladder wall thickening. Correlate with urinalysis for cystitis.\par \par Moderate abdominal and pelvic ascites with peritoneal nodularity compatible with carcinomatosis. Hazy soft tissue surrounds the gastric antrum in the right upper quadrant compatible with metastatic disease. Yes

## 2024-04-02 NOTE — OB RN PATIENT PROFILE - NS_NPO_OBGYN_ALL_OB_DT
Alert-The patient is alert, awake and responds to voice. The patient is oriented to time, place, and person. The triage nurse is able to obtain subjective information.
01-Apr-2024 23:25

## 2024-04-02 NOTE — OB PROVIDER H&P - ASSESSMENT
A/P: Pt is a 38y  who presents for IOL for PROM@1030p    1. Admit to L&D. Routine Labs. IVF  2. IOL with PO cytotec and cervical balloon, pitocin at 6a  3. Fetus: Vertex, Reactive/CEFM  4. GBS pos, for Amp  5. Pain: IV pain meds/epidural PRN     Discussed with Dr. Rogers Strange MD, PGY-1  Obstetrics and Gynecology

## 2024-04-02 NOTE — OB PROVIDER LABOR PROGRESS NOTE - NS_DILATION_OBGYN_ALL_OB_NU
1
7
Patient notified that rx was sent to her phamacy.    She knows to keep appt on 17th with gucci  
10
9.5
5

## 2024-04-02 NOTE — OB RN PATIENT PROFILE - NSICDXPASTMEDICALHX_GEN_ALL_CORE_FT
PAST MEDICAL HISTORY:  H/O dilation and curettage     Miscarriage      (normal spontaneous vaginal delivery)

## 2024-04-02 NOTE — OB PROVIDER H&P - ATTENDING COMMENTS
Agree with above  Patient PROM 1cm long  Plan for IOL with PO/CB and pitocin  Epidural PRN  Expect     marianne burgos

## 2024-04-02 NOTE — OB PROVIDER LABOR PROGRESS NOTE - ASSESSMENT
Pushing but not moving head effectively  Will labor down x 15 min with peanut ball and then restart pushing    marianne burgos
Pt is a 37yo  admitted for IOL for PROM now with cervical balloon in place.    - Continue cont EFM, toco, IVF  - Continue IOL with PO cytotec and cervical balloon    Plan per Dr. Rogers Strange MD PGY1
A/P:  - c/w pitocin  - [facility] Cat I  - Dr. Mccloud in house, aware    Eva Almaguer PA-C
Patient was just examined by resident making good progress in labor  Plan for expectant management currently  Discussed with patient regarding increased pressure    amrianne burgos
Pt has made cervical change. Pitocin paused. Pt repositioned to resuscitate tracing to good effect.     - Dr. Mccloud en route    D/w Dr. Rogers Wells PGY2
Pt is a 39yo  admitted for IOL for PROM now sp cervical balloon.    - Continue cont EFM, toco, IVF  - 500cc LR bolus ordered  - Start pitocin at 6a    Kaya Strange MD PGY1

## 2024-04-02 NOTE — OB RN DELIVERY SUMMARY - AS DELIV COMPLICATIONS OB
[facility]    [place]  [address] [place] [address]    Phone:  [phone]    Fax:  [phone]           Raquel Tyrone   MRN: [id number]    Department:  [facility]   Date of Visit:  1/1 Expect to receive an electronic request (by e-mail or text) to complete a self-assessment the day after your visit. You may also receive a call from our patient liason soon after your visit.  Also, some patients receive a detailed feedback survey mailed to [address] [phone] [address] ([address] [facility] [id number] ([address]) [address] [place] [address] abnormal fetal heart rate tracing

## 2024-04-02 NOTE — OB PROVIDER DELIVERY SUMMARY - NSPROVIDERDELIVERYNOTE_OBGYN_ALL_OB_FT
Spontaneous vaginal delivery of liveborn infant boy from OA position  Delayed cord clamping performed  Cord gases collected  Placenta delivered intact  Fundus firm  Intact cervix, vaginal walls and sulci  2nd deg lac repaired with 2.0 vicryl rapide  Excellent hemostasis  Count correct x 2    marianne burgos

## 2024-04-02 NOTE — OB PROVIDER H&P - NSLOWPPHRISK_OBGYN_A_OB
No previous uterine incision/[place] Pregnancy/Less than or equal to 4 previous vaginal births/No known bleeding disorder/No history of postpartum hemorrhage

## 2024-04-02 NOTE — OB PROVIDER LABOR PROGRESS NOTE - NS_SUBJECTIVE/OBJECTIVE_OBGYN_ALL_OB_FT
S:  Pt seen and examined for deceleration. Cervical balloon found to be in vagina.    O:  Vital Signs Last 24 Hrs  T(C): 37.1 (02 Apr 2024 03:43), Max: 37.1 (01 Apr 2024 23:57)  T(F): 98.78 (01 Apr 2024 23:57), Max: 98.78 (01 Apr 2024 23:57)  HR: 122 (02 Apr 2024 05:08) (87 - 132)  BP: 116/60 (02 Apr 2024 05:07) (102/58 - 122/55)  BP(mean): --  RR: 18 (02 Apr 2024 03:43) (18 - 18)  SpO2: 99% (02 Apr 2024 05:08) (96% - 100%)
Pt seen at bedside  Feeling mild pressure
S:  Pt seen and examined for cervical balloon placement. Cervical balloon placed in usual fashion.    O:  Vital Signs Last 24 Hrs  T(C): --  T(F): --  HR: 95 (02 Apr 2024 01:29) (92 - 127)  BP: 109/65 (01 Apr 2024 23:57) (109/65 - 109/65)  BP(mean): --  RR: --  SpO2: 98% (02 Apr 2024 01:29) (96% - 100%)
Pt evaluated after prolonged 5min decel
OB PA Progress Note    Pt seen and evaluated for cervical change c/o increased pressure and pain.     Exam  VSS  SVE 9.5/100/0  EFM Cat I  [place] Q2min
Pushed with patient

## 2024-04-02 NOTE — OB RN DELIVERY SUMMARY - NSSELHIDDEN_OBGYN_ALL_OB_FT
[NS_DeliveryAttending1_OBGYN_ALL_OB_FT:[id number]],[NS_DeliveryRN_OBGYN_ALL_OB_FT:[id number]]

## 2024-04-03 ENCOUNTER — TRANSCRIPTION ENCOUNTER (OUTPATIENT)
Age: 39
End: 2024-04-03

## 2024-04-03 VITALS
HEART RATE: 94 BPM | RESPIRATION RATE: 18 BRPM | SYSTOLIC BLOOD PRESSURE: 123 MMHG | OXYGEN SATURATION: 98 % | DIASTOLIC BLOOD PRESSURE: 80 MMHG | TEMPERATURE: 98 F

## 2024-04-03 PROCEDURE — 86901 BLOOD TYPING SEROLOGIC RH(D): CPT

## 2024-04-03 PROCEDURE — 86850 RBC ANTIBODY SCREEN: CPT

## 2024-04-03 PROCEDURE — 85025 COMPLETE CBC W/AUTO DIFF WBC: CPT

## 2024-04-03 PROCEDURE — 86900 BLOOD TYPING SEROLOGIC ABO: CPT

## 2024-04-03 PROCEDURE — 59050 FETAL MONITOR W/REPORT: CPT

## 2024-04-03 PROCEDURE — 86780 TREPONEMA PALLIDUM: CPT

## 2024-04-03 PROCEDURE — 36415 COLL VENOUS BLD VENIPUNCTURE: CPT

## 2024-04-03 RX ORDER — ACETAMINOPHEN 500 MG
2 TABLET ORAL
Qty: 0 | Refills: 0 | DISCHARGE

## 2024-04-03 RX ORDER — IBUPROFEN 200 MG
1 TABLET ORAL
Qty: 0 | Refills: 0 | DISCHARGE

## 2024-04-03 RX ORDER — ACETAMINOPHEN 500 MG
3 TABLET ORAL
Qty: 0 | Refills: 0 | DISCHARGE
Start: 2024-04-03

## 2024-04-03 RX ORDER — IBUPROFEN 200 MG
1 TABLET ORAL
Qty: 0 | Refills: 0 | DISCHARGE
Start: 2024-04-03

## 2024-04-03 RX ADMIN — Medication 600 MILLIGRAM(S): at 01:00

## 2024-04-03 RX ADMIN — Medication 975 MILLIGRAM(S): at 20:40

## 2024-04-03 RX ADMIN — Medication 600 MILLIGRAM(S): at 17:31

## 2024-04-03 RX ADMIN — Medication 975 MILLIGRAM(S): at 03:40

## 2024-04-03 RX ADMIN — Medication 975 MILLIGRAM(S): at 21:00

## 2024-04-03 RX ADMIN — Medication 600 MILLIGRAM(S): at 00:30

## 2024-04-03 RX ADMIN — Medication 975 MILLIGRAM(S): at 03:05

## 2024-04-03 RX ADMIN — Medication 975 MILLIGRAM(S): at 09:15

## 2024-04-03 RX ADMIN — Medication 600 MILLIGRAM(S): at 06:11

## 2024-04-03 RX ADMIN — Medication 600 MILLIGRAM(S): at 06:41

## 2024-04-03 RX ADMIN — Medication 600 MILLIGRAM(S): at 12:24

## 2024-04-03 RX ADMIN — Medication 975 MILLIGRAM(S): at 16:11

## 2024-04-03 RX ADMIN — SODIUM CHLORIDE 3 MILLILITER(S): 9 INJECTION INTRAMUSCULAR; INTRAVENOUS; SUBCUTANEOUS at 06:11

## 2024-04-03 NOTE — PROGRESS NOTE ADULT - ASSESSMENT
A/P: 39yo PPD#1 s/p .  Patient is stable and doing well post-partum.     #Postpartum care  - Pain well controlled, continue current pain regimen  - Increase ambulation, SCDs when not ambulating  - Continue regular diet  - Knee numbness likely to be consequence of epidural wearing off, will continue to monitor and place anesthesia consult if not improved today    Wily Escalante, PGY1

## 2024-04-03 NOTE — DISCHARGE NOTE OB - CARE PROVIDER_API CALL
[facility]  Obstetrics and Gynecology  [address] [place] [address], [address]  [place] [address]  Phone: [phone]  Fax: [phone]  Follow Up Time:

## 2024-04-03 NOTE — DISCHARGE NOTE OB - MEDICATION SUMMARY - MEDICATIONS TO STOP TAKING
I will STOP taking the medications listed below when I get home from the hospital:    Motrin 800 mg oral tablet  -- 1 tab(s) by mouth 3 times a day    Tylenol 500 mg oral tablet  -- 2 tab(s) by mouth every 6 hours

## 2024-04-03 NOTE — DISCHARGE NOTE OB - HOSPITAL COURSE
Normal spontaneous vaginal delivery. Uncomplicated hospital course. On day of discharge, patient was ambulating, tolerating PO, voiding spontaneously and pain was well controlled. Patient to follow up in 6 weeks with OB for post partum visit.

## 2024-04-03 NOTE — PROGRESS NOTE ADULT - SUBJECTIVE AND OBJECTIVE BOX
OB Progress Note:  PPD#1    S: 37yo on  PPD#1 s/p . Patient feels well. Pain is well controlled. She is endorsing some mild numbness of her L knee since delivery. She is tolerating a regular diet and passing flatus. She is voiding spontaneously, and ambulating without difficulty. Denies CP/SOB. Denies headaches, blurry vision, epigastric pain, lightheadedness/dizziness. Denies N/V.    O:  Vitals:  Vital Signs Last 24 Hrs  T(C): 36.7 (2024 05:10), Max: 37.3 (2024 09:18)  T(F): 98 (2024 05:10), Max: 99.1 (2024 09:18)  HR: 104 (2024 05:10) (85 - 161)  BP: 107/71 (2024 05:10) (99/59 - 128/65)  BP(mean): --  RR: 18 (2024 05:10) (16 - 18)  SpO2: 98% (2024 05:10) (83% - 100%)    Parameters below as of 2024 05:10  Patient On (Oxygen Delivery Method): room air        MEDICATIONS  (STANDING):  acetaminophen     Tablet .. 975 milliGRAM(s) Oral <User Schedule>  diphtheria/tetanus/pertussis (acellular) Vaccine (Adacel) 0.5 milliLiter(s) IntraMuscular once  ibuprofen  Tablet. 600 milliGRAM(s) Oral every 6 hours  lactated ringers Bolus 500 milliLiter(s) IV Bolus once  oxytocin Infusion 41.667 milliUNIT(s)/Min (125 mL/Hr) IV Continuous <Continuous>  oxytocin Infusion. 4 milliUNIT(s)/Min (4 mL/Hr) IV Continuous <Continuous>  prenatal multivitamin 1 Tablet(s) Oral daily  sodium chloride 0.9% lock flush 3 milliLiter(s) IV Push every 8 hours      Labs:  Blood type: A Positive  Rubella IgG: RPR: Negative                          12.3   9.82 >-----------< 186    (  @ 00:50 )             35.6                  Physical Exam:  General: NAD  Abdomen: soft, non-tender, non-distended, fundus firm  Vaginal: Lochia wnl  Extremities: No erythema/edema. Mild numbness to touch on left knee, normal sensation above and below. Normal gross motor function bilaterally.

## 2024-04-03 NOTE — DISCHARGE NOTE OB - PATIENT PORTAL LINK FT
You can access the FollowMyHealth Patient Portal offered by [facility] by registering at the following website: [email or web address]. By joining [facility]’s FollowMyHealth portal, you will also be able to view your health information using other applications (apps) compatible with our system.

## 2024-04-03 NOTE — DISCHARGE NOTE OB - MEDICATION SUMMARY - MEDICATIONS TO TAKE
I will START or STAY ON the medications listed below when I get home from the hospital:    ibuprofen 600 mg oral tablet  -- 1 tab(s) by mouth every 6 hours  -- Indication: For  (normal spontaneous vaginal delivery)    acetaminophen 325 mg oral tablet  -- 3 tab(s) by mouth every 6 hours  -- Indication: For  (normal spontaneous vaginal delivery)    Prenatal Multivitamins with Folic Acid 1 mg oral tablet  -- 1 tab(s) by mouth once a day  -- Indication: For  (normal spontaneous vaginal delivery)

## 2024-04-03 NOTE — DISCHARGE NOTE OB - CARE PLAN
1 Principal Discharge DX:	 (normal spontaneous vaginal delivery)  Assessment and plan of treatment:	Please call office for follow-up appointment. Regular diet. Activity as tolerated.

## 2024-04-04 ENCOUNTER — APPOINTMENT (OUTPATIENT)
Dept: OBGYN | Facility: CLINIC | Age: 39
End: 2024-04-04
Payer: COMMERCIAL

## 2024-04-04 ENCOUNTER — APPOINTMENT (OUTPATIENT)
Dept: OBGYN | Facility: CLINIC | Age: 39
End: 2024-04-04

## 2024-04-04 PROBLEM — Z98.890 OTHER SPECIFIED POSTPROCEDURAL STATES: Chronic | Status: ACTIVE | Noted: 2024-04-02

## 2024-04-04 PROBLEM — O03.9 COMPLETE OR UNSPECIFIED SPONTANEOUS ABORTION WITHOUT COMPLICATION: Chronic | Status: ACTIVE | Noted: 2024-04-02

## 2024-04-04 PROCEDURE — 99213 OFFICE O/P EST LOW 20 MIN: CPT

## 2024-05-15 ENCOUNTER — APPOINTMENT (OUTPATIENT)
Dept: OBGYN | Facility: CLINIC | Age: 39
End: 2024-05-15
Payer: COMMERCIAL

## 2024-05-15 PROCEDURE — 0503F POSTPARTUM CARE VISIT: CPT

## 2025-02-07 ENCOUNTER — APPOINTMENT (OUTPATIENT)
Dept: OBGYN | Facility: CLINIC | Age: 40
End: 2025-02-07

## 2025-05-16 ENCOUNTER — APPOINTMENT (OUTPATIENT)
Dept: OBGYN | Facility: CLINIC | Age: 40
End: 2025-05-16

## 2025-05-16 PROCEDURE — 96127 BRIEF EMOTIONAL/BEHAV ASSMT: CPT

## 2025-05-16 PROCEDURE — 99395 PREV VISIT EST AGE 18-39: CPT

## 2025-05-16 PROCEDURE — 99459 PELVIC EXAMINATION: CPT | Mod: NC

## (undated) DEVICE — VACUUM CURETTE BERKLEY OLYMPUS F TIP 6MM

## (undated) DEVICE — VACUUM CURETTE BERKLEY OLYMPUS CURVED 16MM X 1/2"

## (undated) DEVICE — WARMING BLANKET UPPER ADULT

## (undated) DEVICE — FOLEY CATH 2-WAY 16FR 5CC SILICONE

## (undated) DEVICE — VACUUM CURETTE BERKLEY OLYMPUS CURVED 11MM

## (undated) DEVICE — POSITIONER PATIENT SAFETY STRAP 3X60"

## (undated) DEVICE — VACUUM CURETTE BERKLEY OLYMPUS CURVED 7MM

## (undated) DEVICE — VACUUM CURETTE BUSSE HOSP CURVED 11MM

## (undated) DEVICE — VACUUM CURETTE BERKLEY OLYMPUS CURVED 8MM

## (undated) DEVICE — VACUUM CURETTE BUSSE HOSP CURVED 12MM

## (undated) DEVICE — SOL IRR POUR NS 0.9% 500ML

## (undated) DEVICE — PACK LITHOTOMY

## (undated) DEVICE — TUBING UTERINE ASPIRATION 3/8" X 6FT W/O ADAPTER

## (undated) DEVICE — VACUUM CURETTE BERKLEY OLYMPUS CURVED 12MM

## (undated) DEVICE — POSITIONER FOAM EGG CRATE ULNAR 2PCS (PINK)

## (undated) DEVICE — VACUUM CURETTE BUSSE HOSP STRAIGHT 7MM

## (undated) DEVICE — SOCK SPECIMEN 3/8"-1/2" MALE PORT

## (undated) DEVICE — GLV 6.5 PROTEXIS (WHITE)

## (undated) DEVICE — Device

## (undated) DEVICE — VACUUM CURETTE BUSSE HOSP CURVED 14MM

## (undated) DEVICE — VACUUM CURETTE BUSSE HOSP CURVED 9MM

## (undated) DEVICE — VACUUM CURETTE BUSSE HOSP CURVED 10MM

## (undated) DEVICE — DRAPE LIGHT HANDLE COVER (GREEN)

## (undated) DEVICE — VACUUM CURETTE BERKLEY OLYMPUS CURVED 9MM

## (undated) DEVICE — VACUUM CURETTE MEDGYN CURVED 13MM